# Patient Record
Sex: MALE | Race: WHITE | NOT HISPANIC OR LATINO | Employment: OTHER | URBAN - METROPOLITAN AREA
[De-identification: names, ages, dates, MRNs, and addresses within clinical notes are randomized per-mention and may not be internally consistent; named-entity substitution may affect disease eponyms.]

---

## 2020-10-09 ENCOUNTER — OFFICE VISIT (OUTPATIENT)
Dept: FAMILY MEDICINE | Facility: CLINIC | Age: 45
End: 2020-10-09
Payer: MEDICARE

## 2020-10-09 VITALS
TEMPERATURE: 97.5 F | BODY MASS INDEX: 25.42 KG/M2 | WEIGHT: 198 LBS | SYSTOLIC BLOOD PRESSURE: 128 MMHG | OXYGEN SATURATION: 96 % | HEART RATE: 99 BPM | DIASTOLIC BLOOD PRESSURE: 90 MMHG

## 2020-10-09 DIAGNOSIS — G25.9 EXTRAPYRAMIDAL MOVEMENT DISORDER: ICD-10-CM

## 2020-10-09 DIAGNOSIS — F20.9 SCHIZOPHRENIA, UNSPECIFIED TYPE (H): Primary | ICD-10-CM

## 2020-10-09 PROCEDURE — 99203 OFFICE O/P NEW LOW 30 MIN: CPT | Performed by: PHYSICIAN ASSISTANT

## 2020-10-09 RX ORDER — BENZTROPINE MESYLATE 0.5 MG/1
TABLET ORAL
COMMUNITY
Start: 2020-08-29 | End: 2022-01-04

## 2020-10-09 RX ORDER — GABAPENTIN 600 MG/1
TABLET ORAL
COMMUNITY
Start: 2020-08-10

## 2020-10-09 NOTE — PROGRESS NOTES
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  He does get the majority of his care through psychiatry, and they are a bit concerned about extrapyramidal movements.  At this time they are hesitant to adjust meds, and he feels he is very well controlled.      The most annoying movement is an overhead stretch that he does quite often.    Review of Systems   Constitutional, HEENT, cardiovascular, pulmonary, gi and gu systems are negative, except as otherwise noted.      Objective    There were no vitals taken for this visit.  There is no height or weight on file to calculate BMI.  Physical Exam   GENERAL: alert and no distress  EYES: Eyes grossly normal to inspection  RESP: lungs clear to auscultation - no rales, rhonchi or wheezes  CV: regular rate and rhythm, normal S1 S2, no S3 or S4, no murmur, click or rub, no peripheral edema and peripheral pulses strong  NEURO: does have the overhead stretch movement several times during his visit.      No results found for this or any previous visit (from the past 24 hour(s)).        Assessment & Plan     Schizophrenia, unspecified type (H)  Follows with psychiatry and well controlled from symptom stand point.    Extrapyramidal movement disorder  Will get him into neuro for further evaluation and treatment.  - NEUROLOGY ADULT REFERRAL  - NEUROLOGY ADULT REFERRAL     Tobacco Cessation:    reports that he has been smoking. He uses smokeless tobacco.               No follow-ups on file.    Lamin Soto PA-C  [facility]

## 2021-12-27 ENCOUNTER — NURSE TRIAGE (OUTPATIENT)
Dept: FAMILY MEDICINE | Facility: CLINIC | Age: 46
End: 2021-12-27
Payer: MEDICARE

## 2021-12-27 NOTE — TELEPHONE ENCOUNTER
"    Reason for Disposition    Weakness of a leg or foot (e.g., unable to bear weight, dragging foot)    Additional Information    Negative: Passed out (i.e., fainted, collapsed and was not responding)    Negative: Shock suspected (e.g., cold/pale/clammy skin, too weak to stand, low BP, rapid pulse)    Negative: Sounds like a life-threatening emergency to the triager    Negative: Major injury to the back (e.g., MVA, fall > 10 feet or 3 meters, penetrating injury, etc.)    Negative: Pain in the upper back over the ribs (rib cage) that radiates (travels) into the chest    Negative: Pain in the upper back over the ribs (rib cage) and worsened by coughing (or clearly increases with breathing)    Negative: SEVERE back pain of sudden onset and age > 60    Negative: SEVERE abdominal pain (e.g., excruciating)    Negative: Abdominal pain and age > 60    Negative: Unable to urinate (or only a few drops) and bladder feels very full    Negative: Loss of bladder or bowel control (urine or bowel incontinence; wetting self, leaking stool) of new onset    Negative: Numbness (loss of sensation) in groin or rectal area    Negative: Pain radiates into groin, scrotum    Negative: Blood in urine (red, pink, or tea-colored)    Negative: Vomiting and pain over lower ribs of back (i.e., flank - kidney area)    Answer Assessment - Initial Assessment Questions  1. ONSET: \"When did the pain begin?\"       5-6 days ago and getting worse  2. LOCATION: \"Where does it hurt?\" (upper, mid or lower back)      Low back and (R) leg numbness  3. SEVERITY: \"How bad is the pain?\"  (e.g., Scale 1-10; mild, moderate, or severe)    - MILD (1-3): doesn't interfere with normal activities     - MODERATE (4-7): interferes with normal activities or awakens from sleep     - SEVERE (8-10): excruciating pain, unable to do any normal activities       Using walker to walk, better laying down, can not put weight on let  4. PATTERN: \"Is the pain constant?\" (e.g., yes, " "no; constant, intermittent)       constant  5. RADIATION: \"Does the pain shoot into your legs or elsewhere?\"      (R) leg  6. CAUSE:  \"What do you think is causing the back pain?\"       scoliosis  7. BACK OVERUSE:  \"Any recent lifting of heavy objects, strenuous work or exercise?\"      no  8. MEDICATIONS: \"What have you taken so far for the pain?\" (e.g., nothing, acetaminophen, NSAIDS)      Ibuprofen  9. NEUROLOGIC SYMPTOMS: \"Do you have any weakness, numbness, or problems with bowel/bladder control?\"      (R) upper thigh weak and numb, pins and needles sensation front of thigh  10. OTHER SYMPTOMS: \"Do you have any other symptoms?\" (e.g., fever, abdominal pain, burning with urination, blood in urine)        no  11. PREGNANCY: \"Is there any chance you are pregnant?\" (e.g., yes, no; LMP)        n/a    Protocols used: BACK PAIN-A-OH      "

## 2021-12-30 NOTE — PROGRESS NOTES
Assessment & Plan     Acute right-sided low back pain with right-sided sciatica    - [facility] Referral; Future    Schizophrenia, unspecified type (H)  Stable, follows with psych               Tobacco Cessation:   reports that he has been smoking. He uses smokeless tobacco.          No follow-ups on file.    [facility]    Talib Gerard is a 46 year old who presents for the following health issues     HPI   Answers for HPI/ROS submitted by the patient on 1/4/2022  Your back pain is: new  What do you think is the original cause of your back pain?: not sure  When did you first notice your back pain? : 1-4 weeks ago  How would you describe your back pain? : cramping, shooting, stabbing  How often do you feel your back pain? : rarely  Where is your back pain located? : right lower back, right buttock, right hip  Where does your back pain spread? : right buttocks, right thigh  Since you noticed your back pain, how has it changed? : gradually improving  Does your back pain interfere with your job?: Not applicable  On a scale of 1-10 (10 being the worst), how strong is your back pain?: 10  What makes your back pain worse? : standing  Acupuncture:: not tried  Acetaminophen: not tried  Activity or Exercise: not tried  Chiropractor: not tried  Cold: helpful  Heat: helpful  Massage: not tried  Muscle relaxants : not tried  NSAIDS (Ibuprofen, Naproxen) : helpful  Opioids: not tried  Physical Therapy: not tried  Rest: helpful  Steroid Injection: not tried  Stretching : helpful  Surgery: not tried  TENS Unit: not tried  Topical pain relievers : not tried  Do you see any other healthcare providers for your back pain? : None      Pain History:  When did you first notice your pain? - 1 to 6 weeks   Have you seen any provider previously for this issue? Yes - [facility] urgent [facility]  How has your pain affected your ability to work? Not currently working - unrelated to pain  Where  in your body do you have pain? Musculoskeletal problem/pain  Onset/Duration: X10 days  Description  Location: Right leg, lower back - right  Joint Swelling: no  Redness: no  Pain: YES  Warmth: no  Intensity:  moderate  Progression of Symptoms:  improving  Accompanying signs and symptoms:   Fevers: no  Numbness/tingling/weakness: YES  History  Trauma to the area: no  Recent illness:  no  Previous similar problem: no  Previous evaluation:  YES  Precipitating or alleviating factors:  Aggravating factors include: standing, sitting to standing  Therapies tried and outcome: Took Rx steroid, this helped      Much improved from visit with [facility] after steroid pack.  They gave him rx for PHYSICAL THERAPY, but would like to go through  [facility].      At our last visit he was having some extrapyramidal movements secondary to pysch meds, those have been changed, and he is doing great.    Review of Systems   Constitutional, HEENT, cardiovascular, pulmonary, gi and gu systems are negative, except as otherwise noted.      Objective    BP (!) 134/92 (BP Location: Left arm, Patient Position: Chair, Cuff Size: Adult Regular)   Pulse 88   Temp 98.5  F (36.9  C) (Oral)   Wt 87.6 kg (193 lb 0.7 oz)   SpO2 98%   BMI 24.79 kg/m    Body mass index is 24.79 kg/m .  Physical Exam   GENERAL: alert and no distress  EYES: Eyes grossly normal to inspection  RESP: lungs clear to auscultation - no rales, rhonchi or wheezes  CV: regular rate and rhythm, normal S1 S2, no S3 or S4, no murmur, click or rub, no peripheral edema and peripheral pulses strong  PSYCH: mentation appears normal, affect normal/bright

## 2022-01-04 ENCOUNTER — OFFICE VISIT (OUTPATIENT)
Dept: FAMILY MEDICINE | Facility: CLINIC | Age: 47
End: 2022-01-04
Payer: MEDICARE

## 2022-01-04 VITALS
BODY MASS INDEX: 24.79 KG/M2 | HEART RATE: 88 BPM | DIASTOLIC BLOOD PRESSURE: 92 MMHG | WEIGHT: 193.04 LBS | TEMPERATURE: 98.5 F | SYSTOLIC BLOOD PRESSURE: 134 MMHG | OXYGEN SATURATION: 98 %

## 2022-01-04 DIAGNOSIS — M54.41 ACUTE RIGHT-SIDED LOW BACK PAIN WITH RIGHT-SIDED SCIATICA: Primary | ICD-10-CM

## 2022-01-04 DIAGNOSIS — F20.9 SCHIZOPHRENIA, UNSPECIFIED TYPE (H): ICD-10-CM

## 2022-01-04 PROCEDURE — 99213 OFFICE O/P EST LOW 20 MIN: CPT | Performed by: PHYSICIAN ASSISTANT

## 2022-01-04 RX ORDER — VALBENAZINE 80 MG/1
CAPSULE ORAL
COMMUNITY
Start: 2021-12-29 | End: 2024-05-10

## 2022-01-04 RX ORDER — PERPHENAZINE 8 MG/1
TABLET ORAL
COMMUNITY
Start: 2021-10-05

## 2022-01-04 ASSESSMENT — PAIN SCALES - GENERAL: PAINLEVEL: MILD PAIN (3)

## 2022-01-06 ENCOUNTER — THERAPY VISIT (OUTPATIENT)
Dept: PHYSICAL THERAPY | Facility: CLINIC | Age: 47
End: 2022-01-06
Payer: MEDICARE

## 2022-01-06 DIAGNOSIS — M54.41 ACUTE RIGHT-SIDED LOW BACK PAIN WITH RIGHT-SIDED SCIATICA: ICD-10-CM

## 2022-01-06 PROCEDURE — 97161 PT EVAL LOW COMPLEX 20 MIN: CPT | Mod: GP | Performed by: PHYSICAL THERAPIST

## 2022-01-06 PROCEDURE — 97110 THERAPEUTIC EXERCISES: CPT | Mod: GP | Performed by: PHYSICAL THERAPIST

## 2022-01-06 NOTE — PROGRESS NOTES
[facility] Initial Evaluation     Present: no    Subjective:  Moustapha Schreiber is a 46 year old male with a lumbar condition. Pt reports that 2 weeks ago he started getting pain in his right leg that gradually started getting worse and is going into the lumbar region now. Chief complaint is pain first thing in the morning and with sitting. Numbness in the anterior right thigh/hip otherwise no vague symptoms. Would like to get back to PLOF, walk, sit, and do everything pain free.      Symptoms commenced as a result of: unsure. Condition occurred in the following environment: unsure. Onset of symptoms: 1/4/22(date of MD PT order). Location of symptoms: lower lumbar right. Pain level on number scale: 4/10. Quality of pain: aching. Associated symptoms: numbness in the anterior hip and thigh on the right. Pain frequency (constant/intermittent): intermittent. Symptoms are exacerbated by: sitting, mornings. Symptoms are relieved by: walking, steroids, movement. Progression of symptoms since onset (same/better/worse): better. Special tests (x-ray, MRI, CT scan, EMG, bone scan): see EPIC. Previous treatment: medication. Improvement with previous treatment: yes. General health as reported by patient is good. Pertinent medical history includes: See Epic. Medical allergies: see Epic. Other pertinent surgeries: see Epic. Current medications: See Epic. Occupation: none. Patient is (working in normal job without restrictions/working in normal job with restrictions/working in an alternate job/not working due to present treatment problem): none. Primary job tasks: lifting, carrying, driving, sitting. Barriers at home/work: None reported by patient. Red flags: None reported by patient.    Objective  Posture: forwad head rounded shoulders    Gait: normal    Screening: negative    Flexibility: unremarkable    Lumbar Movement Loss Response   Flexion Slightly limited no pain and no change with repeated  motion   Extension Slightly limited no pain and no change with repeated    Side bending/glide L Full   Side bending/glide R Full    Rotation L Full   Rotation R Full   Quadrants (if applicable)      Hip PROM/Strength: ROM WFL zain    Neurological:    Myotomes L R   L1-2 (hip flexion) 5/5 5/5   L3 (knee extension) 5/5 5/5   L4 (ankle DF) 5/5 5/5   L5 (g. toe ext) 5/5 5/5   S1 (ankle PF or knee flex) 5/5 5/5     Dural Signs L R   Slump - -   SLR - -     Palpation: unremarkable    Prone Assessment: LATOYA central lumbar pain no thigh symptoms tolerates, Pressups slightly limited and with repeated motion tolerates with slight thigh symptoms    Accessory Motion: CPA T10, L3-L5 tender and no change with repeated     Functional Squat and Balance: Fair squat, fair SLS zain   Key Findings  -decreased lumbar extension ROM will trial extension  Assessment/Plan:    Patient is a 46 year old male with lumbar complaints.    Patient has the following significant findings with corresponding treatment plan.                Diagnosis 1:  Acute lumbar pain with right sided radiculopathy  Pain -  manual therapy, self management, education and home program  Decreased ROM/flexibility - manual therapy and therapeutic exercise  Decreased joint mobility - manual therapy and therapeutic exercise  Decreased strength - therapeutic exercise and therapeutic activities  Impaired muscle performance - neuro re-education  Decreased function - therapeutic activities  Impaired posture - neuro re-education    Therapy Evaluation Codes:   1) History comprised of:   Personal factors that impact the plan of care:      Past/current experiences and Time since onset of symptoms.    Comorbidity factors that impact the plan of care are:      Depression, Mental illness and Numbness/tingling.     Medications impacting care: Anti-depressant and Steroids.  2) Examination of Body Systems comprised of:   Body structures and functions that impact the plan of care:      Hip  and Lumbar spine.   Activity limitations that impact the plan of care are:      Bathing, Bending, Cooking, Driving, Dressing, Lifting, Reading/Computer work, Sitting, Squatting/kneeling, Stairs, Standing and Walking.  3) Clinical presentation characteristics are:   Stable/Uncomplicated.  4) Decision-Making    Low complexity using standardized patient assessment instrument and/or measureable assessment of functional outcome.  Cumulative Therapy Evaluation is: Low complexity.    Previous and current functional limitations:  (See Goal Flow Sheet for this information)    Short term and Long term goals: (See Goal Flow Sheet for this information)     Communication ability:  Patient appears to be able to clearly communicate and understand verbal and written communication and follow directions correctly.  Treatment Explanation - The following has been discussed with the patient:   RX ordered/plan of care  Anticipated outcomes  Possible risks and side effects  This patient would benefit from PT intervention to resume normal activities.   Rehab potential is good.    Frequency:  1 X week, once daily  Duration:  for 10 weeks  Discharge Plan:  Achieve all LTG.  Independent in home treatment program.  Reach maximal therapeutic benefit.    Please refer to the daily flowsheet for treatment today, total treatment time and time spent performing 1:1 timed codes.     Please Contact me with any questions or concerns. Thank you for for patience and cooperation.     Elan Arndt PT, DPT, CSCS  Physical Therapist  [facility] [place]  [phone]

## 2022-01-06 NOTE — PROGRESS NOTES
[facility]    OUTPATIENT Physical Therapy ORTHOPEDIC EVALUATION  PLAN OF TREATMENT FOR OUTPATIENT REHABILITATION  (COMPLETE FOR INITIAL CLAIMS ONLY)  Patient's Last Name, First Name, M.I.  YOB: 1975  Moustapha Schreiber    Provider s Name:  [facility]   Medical Record No.  [id number]   Start of Care Date:  01/06/22   Onset Date:   01/04/22 (date of MD PT order)   Type:     _X__PT   ___OT Medical Diagnosis:    Encounter Diagnosis   Name Primary?     Acute right-sided low back pain with right-sided sciatica         Treatment Diagnosis:  Acute lumbar pain with right sided radiculopathy        Goals:     01/06/22 0500   Body Part   Goals listed below are for lumbar   Goal #1   Goal #1 posture/body mechanics   Previous Functional Level Patient reports fair posture and proper body mechanics   Current Functional Level Fair posture/body mechanics   STG Target Performance Patient able to demonstrate good posture and body mechanics when prompted   Rationale to prevent neck/back pain and avoid injury when lifting/carrying and performing tasks requiring bending   Due Date 01/27/22   LTG Target Performance Patient able to demonstrate good posture and body mechanics without prompting   Rationale to prevent neck/back pain and avoid injury when lifting/carrying and performing tasks requiring bending   Due Date 03/17/22   Goal #2   Goal #2 sitting   Previous Functional Level No restrictions   Current Functional Level Minutes patient can sit   Performance level x 15 minutes limited by pain    STG Target Performance Minutes patient will be able to sit   Performance level > 30 minutes with less than 2/10 pain   Rationale for personal hygiene;to allow rest from standing;for community transportation;for job requirements in their work place   Due date 01/27/22   LTG Target Performance Hours  patient will be able to sit   Performance Level unrestricted pain free   Rationale to allow rest from standing;for personal hygiene;for community transportation;for job requirements in their work place   Due date 03/17/22       Therapy Frequency:  1 x week   Predicted Duration of Therapy Intervention:  10 weeks    Elan Arndt, PT                 I CERTIFY THE NEED FOR THESE SERVICES FURNISHED UNDER        THIS PLAN OF TREATMENT AND WHILE UNDER MY CARE     (Physician attestation of this document indicates review and certification of the therapy plan).                       Certification Date From:  01/06/22   Certification Date To:  03/17/22    Referring Provider:  No ref. provider found    Initial Assessment        See Epic Evaluation SOC Date: 01/06/22

## 2022-01-20 ENCOUNTER — THERAPY VISIT (OUTPATIENT)
Dept: PHYSICAL THERAPY | Facility: CLINIC | Age: 47
End: 2022-01-20
Payer: MEDICARE

## 2022-01-20 DIAGNOSIS — M54.41 ACUTE RIGHT-SIDED LOW BACK PAIN WITH RIGHT-SIDED SCIATICA: ICD-10-CM

## 2022-01-20 PROCEDURE — 97110 THERAPEUTIC EXERCISES: CPT | Mod: GP | Performed by: PHYSICAL THERAPIST

## 2022-02-03 ENCOUNTER — THERAPY VISIT (OUTPATIENT)
Dept: PHYSICAL THERAPY | Facility: CLINIC | Age: 47
End: 2022-02-03
Payer: MEDICARE

## 2022-02-03 DIAGNOSIS — M54.41 ACUTE RIGHT-SIDED LOW BACK PAIN WITH RIGHT-SIDED SCIATICA: ICD-10-CM

## 2022-02-03 PROCEDURE — 97110 THERAPEUTIC EXERCISES: CPT | Mod: GP | Performed by: PHYSICAL THERAPIST

## 2022-02-03 NOTE — PROGRESS NOTES
DISCHARGE REPORT    Progress reporting period is from 1/6/22 to 2/3/22.       SUBJECTIVE  Subjective changes noted by patient:  Subjective: Moustapha reports that he still has some numbness, but the pain is no longer there. Feels ready to discharge and will continue HEP as able.     Current pain level is 0/10 Current Pain level: 0/10.     Previous pain level was  4/10 Initial Pain level: 4/10.   Changes in function:  Yes (See Goal flowsheet attached for changes in current functional level)  Adverse reaction to treatment or activity: None    OBJECTIVE  Changes noted in objective findings:  Yes, improved motion, posture, radicular symptoms, strength, and function  Objective: Lumbar AROM: flexion full just tightness and with repeated motion improves, extension full no pain and no change with repeated motion, SB and rotation full zain pain free, pressups full no pain and tolerates well with repeated motion     ASSESSMENT/PLAN  Updated problem list and treatment plan: Diagnosis 1:  Acute lumbar pain with right sided radiculopathy  Decreased strength - therapeutic exercise and therapeutic activities  Impaired muscle performance - neuro re-education  Decreased function - therapeutic activities  STG/LTGs have been met or progress has been made towards goals:  Yes (See Goal flow sheet completed today.)  Assessment of Progress: The patient has met all of their long term goals.  Self Management Plans:  Patient has been instructed in a home treatment program.  Patient is independent in a home treatment program.  Patient  has been instructed in self management of symptoms.  Patient is independent in self management of symptoms.  I have re-evaluated this patient and find that the nature, scope, duration and intensity of the therapy is appropriate for the medical condition of the patient.  Moustapha continues to require the following intervention to meet STG and LTG's:  PT intervention is no longer required to meet  STG/LTG.    Recommendations:  This patient is ready to be discharged from therapy and continue their home treatment program.    Please refer to the daily flowsheet for treatment today, total treatment time and time spent performing 1:1 timed codes.    Please Contact me with any questions or concerns. Thank you for for patience and cooperation.     Elan Arndt PT, DPT, [facility]  Physical Therapist  [facility] [facility] [place]  [phone]

## 2022-04-25 NOTE — PROGRESS NOTES
Assessment & Plan     Rash and nonspecific skin eruption  The exact diagnosis of the skin lesion is not clear.  It has some consistency with atopic dermatitis and psoriasis.  It is possible that this could be fungal as well.  I recommended he see dermatology for further evaluation.  In the meantime he may try triamcinolone to see if this helps.  If the triamcinolone is not helping after couple of weeks he could switch to an a topical antifungal like clotrimazole or terbinafine.  - triamcinolone (KENALOG) 0.1 % external ointment; Apply topically 2 times daily for 14 days  - Adult Dermatology Referral; Future    Elevated blood pressure reading without diagnosis of hypertension  His initial blood pressure was high.  This came down on the second check, but was still borderline elevated.  I recommended that he stop smoking, start getting more regular exercise and decrease his salt intake which he reports is very high.  I recommended he schedule follow-up with his PCP in 3 months or sooner if needed.    Tobacco abuse  We discussed smoking cessation and I encouraged him to quit.  He is feeling motivated to quit on his own without medication.               Tobacco Cessation:   reports that he has been smoking. He uses smokeless tobacco.  Tobacco Cessation Action Plan: We discussed smoking cessation and I encouraged him to quit.        Return in about 4 weeks (around 5/25/2022) for Follow up if symptoms not improving..    Beau Hutchins, [facility]   Moustapha is a 47 year old who presents for the following health issues     History of Present Illness       Reason for visit:  Thing on my leg  Symptom onset:  More than a month  Symptom intensity:  Mild  Symptom progression:  Staying the same  Had these symptoms before:  No  What makes it worse:  No  What makes it better:  No    He eats 0-1 servings of fruits and vegetables daily.He consumes 1 sweetened beverage(s) daily.He  exercises with enough effort to increase his heart rate 20 to 29 minutes per day.  He exercises with enough effort to increase his heart rate 3 or less days per week.   He is taking medications regularly.       Rash  Onset/Duration: 1-2 months   Description  Location: Right thigh   Character: round, red  Itching: no  Intensity:  mild  Progression of Symptoms:  same  Accompanying signs and symptoms:   Fever: no  Body aches or joint pain: no  Sore throat symptoms: no  Recent cold symptoms: no  History:           Previous episodes of similar rash: None  New exposures:  None  Recent travel: no  Exposure to similar rash: no  Precipitating or alleviating factors: None  Therapies tried and outcome: none     he denies any recent tick exposures or bites.    Review of Systems         Objective    /88   Pulse 94   Temp 98.7  F (37.1  C) (Temporal)   Wt 86.5 kg (190 lb 9.6 oz)   SpO2 97%   BMI 24.47 kg/m    Body mass index is 24.47 kg/m .  Physical Exam   GENERAL: healthy, alert and no distress  EYES: Eyes grossly normal to inspection, PERRL and conjunctivae and sclerae normal  NECK: no adenopathy, no asymmetry, masses, or scars and thyroid normal to palpation  CV: regular rate and rhythm, normal S1 S2, no S3 or S4, no murmur, click or rub, no peripheral edema and peripheral pulses strong  MS: no gross musculoskeletal defects noted, no edema  SKIN: There is a 2 x 2 centimeter circular, well demarcated skin lesion on the right anterior thigh.  The border has slightly darker erythematous color than the central portion of the lesion.  There are no other similar lesions on his body.  NEURO: Normal strength and tone, mentation intact and speech normal  PSYCH: mentation appears normal, affect normal/bright

## 2022-04-27 ENCOUNTER — OFFICE VISIT (OUTPATIENT)
Dept: FAMILY MEDICINE | Facility: CLINIC | Age: 47
End: 2022-04-27
Payer: MEDICARE

## 2022-04-27 VITALS
OXYGEN SATURATION: 97 % | BODY MASS INDEX: 24.47 KG/M2 | WEIGHT: 190.6 LBS | HEART RATE: 94 BPM | TEMPERATURE: 98.7 F | DIASTOLIC BLOOD PRESSURE: 88 MMHG | SYSTOLIC BLOOD PRESSURE: 136 MMHG

## 2022-04-27 DIAGNOSIS — R03.0 ELEVATED BLOOD PRESSURE READING WITHOUT DIAGNOSIS OF HYPERTENSION: ICD-10-CM

## 2022-04-27 DIAGNOSIS — R21 RASH AND NONSPECIFIC SKIN ERUPTION: Primary | ICD-10-CM

## 2022-04-27 DIAGNOSIS — Z72.0 TOBACCO ABUSE: ICD-10-CM

## 2022-04-27 PROCEDURE — 99214 OFFICE O/P EST MOD 30 MIN: CPT | Performed by: FAMILY MEDICINE

## 2022-04-27 RX ORDER — TRIAMCINOLONE ACETONIDE 1 MG/G
OINTMENT TOPICAL 2 TIMES DAILY
Qty: 30 G | Refills: 0 | Status: SHIPPED | OUTPATIENT
Start: 2022-04-27 | End: 2022-05-11

## 2023-06-30 ENCOUNTER — OFFICE VISIT (OUTPATIENT)
Dept: FAMILY MEDICINE | Facility: CLINIC | Age: 48
End: 2023-06-30
Payer: MEDICARE

## 2023-06-30 VITALS
TEMPERATURE: 97.2 F | RESPIRATION RATE: 16 BRPM | OXYGEN SATURATION: 98 % | DIASTOLIC BLOOD PRESSURE: 83 MMHG | HEART RATE: 84 BPM | BODY MASS INDEX: 24.52 KG/M2 | WEIGHT: 185 LBS | SYSTOLIC BLOOD PRESSURE: 127 MMHG | HEIGHT: 73 IN

## 2023-06-30 DIAGNOSIS — L30.9 ECZEMA, UNSPECIFIED TYPE: Primary | ICD-10-CM

## 2023-06-30 PROCEDURE — 99213 OFFICE O/P EST LOW 20 MIN: CPT | Performed by: PHYSICIAN ASSISTANT

## 2023-06-30 ASSESSMENT — PAIN SCALES - GENERAL: PAINLEVEL: NO PAIN (0)

## 2023-06-30 NOTE — PROGRESS NOTES
"  Assessment & Plan     Eczema, unspecified type  Looks pretty much resolved at this point.  Discussed lotions, otc cortisone as prevention.  Offered referral to derm, declines for now               Nicotine/Tobacco Cessation:  He reports that he has been smoking cigarettes. He uses smokeless tobacco.  Nicotine/Tobacco Cessation Plan:             [facility]    Talib Gerard is a 48 year old, presenting for the following health issues:  Sore (Left ankle)        6/30/2023     7:00 AM   Additional Questions   Roomed by Bruna MANN     History of Present Illness       Reason for visit:  A sore left ankle and on top of right foot   Symptom onset:  More than a month  Symptoms include:  Discolored, itchy, has fading, no drainage  Symptom intensity:  Mild  Symptom progression:  Improving  Had these symptoms before:  Yes    He eats 0-1 servings of fruits and vegetables daily.He consumes 0 sweetened beverage(s) daily.He exercises with enough effort to increase his heart rate 9 or less minutes per day.  He exercises with enough effort to increase his heart rate 3 or less days per week.   He is taking medications regularly.               Review of Systems   Constitutional, HEENT, cardiovascular, pulmonary, gi and gu systems are negative, except as otherwise noted.      Objective    /83 (BP Location: Left arm, Patient Position: Sitting, Cuff Size: Adult Regular)   Pulse 84   Temp 97.2  F (36.2  C) (Temporal)   Resp 16   Ht 1.854 m (6' 1\")   Wt 83.9 kg (185 lb)   SpO2 98%   BMI 24.41 kg/m    Body mass index is 24.41 kg/m .  Physical Exam   GENERAL: alert and no distress  EYES: Eyes grossly normal to inspection  SKIN: 2 small post inflammatory pigment areas consistent with pruritis.                    "

## 2024-05-10 ENCOUNTER — OFFICE VISIT (OUTPATIENT)
Dept: FAMILY MEDICINE | Facility: CLINIC | Age: 49
End: 2024-05-10
Payer: MEDICARE

## 2024-05-10 VITALS
HEIGHT: 73 IN | BODY MASS INDEX: 24.75 KG/M2 | DIASTOLIC BLOOD PRESSURE: 94 MMHG | WEIGHT: 186.7 LBS | RESPIRATION RATE: 18 BRPM | SYSTOLIC BLOOD PRESSURE: 145 MMHG | OXYGEN SATURATION: 96 % | HEART RATE: 101 BPM | TEMPERATURE: 97.6 F

## 2024-05-10 DIAGNOSIS — L20.84 INTRINSIC ECZEMA: Primary | ICD-10-CM

## 2024-05-10 DIAGNOSIS — F20.9 SCHIZOPHRENIA, UNSPECIFIED TYPE (H): ICD-10-CM

## 2024-05-10 PROCEDURE — 99213 OFFICE O/P EST LOW 20 MIN: CPT | Performed by: PHYSICIAN ASSISTANT

## 2024-05-10 RX ORDER — ESCITALOPRAM OXALATE 20 MG/1
20 TABLET ORAL DAILY
COMMUNITY
Start: 2024-05-10

## 2024-05-10 RX ORDER — BENZTROPINE MESYLATE 0.5 MG/1
1 TABLET ORAL
COMMUNITY
Start: 2024-03-19

## 2024-05-10 RX ORDER — TRIAMCINOLONE ACETONIDE 1 MG/G
OINTMENT TOPICAL 2 TIMES DAILY
Qty: 30 G | Refills: 1 | Status: SHIPPED | OUTPATIENT
Start: 2024-05-10

## 2024-05-10 ASSESSMENT — PAIN SCALES - GENERAL: PAINLEVEL: NO PAIN (0)

## 2024-05-10 NOTE — PROGRESS NOTES
"  Assessment & Plan     Intrinsic eczema    - Adult Dermatology  Referral; Future  - triamcinolone (KENALOG) 0.1 % external ointment; Apply topically 2 times daily    Schizophrenia  Stable, meds updated.  [facility]              Talib   Moustapha is a 49 year old, presenting for the following health issues:  Derm Problem (R foot)        5/10/2024    12:58 PM   Additional Questions   Roomed by Noemí SLAUGHTER MA apprentice   Accompanied by n/a     History of Present Illness       Reason for visit:  Eczema    He eats 0-1 servings of fruits and vegetables daily.He consumes 0 sweetened beverage(s) daily.He exercises with enough effort to increase his heart rate 9 or less minutes per day.  He exercises with enough effort to increase his heart rate 3 or less days per week.   He is taking medications regularly.           Rash (reoccuring eczema)  Onset/Duration: 3 months ago started getting worse   Description  Location: R foot, R ankle, R calf, L thigh  Character: round, blotchy, red, itchy  Itching: moderate, can be severe if not had steroid cream  Intensity:  moderate  Progression of Symptoms:  improving  Accompanying signs and symptoms:   Fever: No  Body aches or joint pain: No  Sore throat symptoms: No  Recent cold symptoms: No  History:           Previous episodes of similar rash: Previous history of eczema  New exposures:  None   Recent travel: No  Exposure to similar rash: YES  Precipitating or alleviating factors: just the steroid cream from his moms.   Therapies tried and outcome: topical steroid - leftover from mom, has been working        Review of Systems  Constitutional, HEENT, cardiovascular, pulmonary, gi and gu systems are negative, except as otherwise noted.      Objective    BP (!) 145/94 (BP Location: Left arm, Patient Position: Sitting, Cuff Size: Adult Regular)   Pulse 101   Temp 97.6  F (36.4  C) (Temporal)   Resp 18   Ht 1.85 m (6' 0.84\")   Wt 84.7 kg (186 lb 11.2 oz)   SpO2 96%  "  BMI 24.74 kg/m    Body mass index is 24.74 kg/m .  Physical Exam   GENERAL: alert and no distress  EYES: Eyes grossly normal to inspection  RESP: lungs clear to auscultation - no rales, rhonchi or wheezes  CV: regular rate and rhythm, normal S1 S2, no S3 or S4, no murmur, click or rub, no peripheral edema   SKIN: erythematous patch over foot and a few on lower extremities            Signed Electronically by: Lamin Soto PA-C

## 2024-06-02 ENCOUNTER — HEALTH MAINTENANCE LETTER (OUTPATIENT)
Age: 49
End: 2024-06-02

## 2025-01-09 ENCOUNTER — VIRTUAL VISIT (OUTPATIENT)
Dept: FAMILY MEDICINE | Facility: CLINIC | Age: 50
End: 2025-01-09
Payer: MEDICARE

## 2025-01-09 DIAGNOSIS — H93.13 TINNITUS OF BOTH EARS: ICD-10-CM

## 2025-01-09 DIAGNOSIS — H93.8X3 SENSATION OF PLUGGED EAR ON BOTH SIDES: Primary | ICD-10-CM

## 2025-01-09 NOTE — PROGRESS NOTES
Moustapha is a 49 year old who is being evaluated via a billable video visit.    How would you like to obtain your AVS? MyChart  If the video visit is dropped, the invitation should be resent by: Text to cell phone: [phone]  Will anyone else be joining your video visit? No      Assessment & Plan     Sensation of plugged ear on both sides  Difficult to determine cause with video visit, need to get him in clinic to evaluate.  Will have our team see if we can get in or discuss other options for in person    Tinnitus of both ears  As above          Nicotine/Tobacco Cessation  He reports that he has been smoking cigarettes. He started smoking about 12 years ago. He has a 6.3 pack-year smoking history. He has quit using smokeless tobacco.  Nicotine/Tobacco Cessation Plan              Subjective   Moustapha is a 49 year old, presenting for the following health issues:  No chief complaint on file.      Video Start Time: 1157    History of Present Illness       Reason for visit:  Ringing in ears. Hearing loss  Symptom onset:  3-7 days ago  Symptom intensity:  Moderate  Symptom progression:  Staying the same  Had these symptoms before:  No   He is taking medications regularly.               Review of Systems  Constitutional, HEENT, cardiovascular, pulmonary, gi and gu systems are negative, except as otherwise noted.      Objective           Vitals:  No vitals were obtained today due to virtual visit.    Physical Exam   GENERAL: alert and no distress  EYES: Eyes grossly normal to inspection.  No discharge or erythema, or obvious scleral/conjunctival abnormalities.  RESP: No audible wheeze, cough, or visible cyanosis.    SKIN: Visible skin clear. No significant rash, abnormal pigmentation or lesions.  NEURO: Cranial nerves grossly intact.  Mentation and speech appropriate for age.  PSYCH: Appropriate affect, tone, and pace of words          Video-Visit Details    Type of service:  Video Visit   Video End Time:1210  Originating  Location (pt. Location): Home    Distant Location (provider location):  On-site  Platform used for Video Visit: Cherie  Signed Electronically by: Lamin Soto PA-C

## 2025-01-09 NOTE — PROGRESS NOTES
Called and LVM To call back and schedule a Office Visit to discuss Hearing Concern Per TIERA  [facility] Unit Coordinator

## 2025-01-13 ENCOUNTER — OFFICE VISIT (OUTPATIENT)
Dept: DERMATOLOGY | Facility: CLINIC | Age: 50
End: 2025-01-13
Payer: MEDICARE

## 2025-01-13 DIAGNOSIS — L81.9 POST-INFLAMMATORY PIGMENTARY CHANGES: ICD-10-CM

## 2025-01-13 DIAGNOSIS — L82.1 SEBORRHEIC KERATOSES: ICD-10-CM

## 2025-01-13 DIAGNOSIS — R21 RASH AND NONSPECIFIC SKIN ERUPTION: Primary | ICD-10-CM

## 2025-01-13 DIAGNOSIS — L20.84 INTRINSIC ECZEMA: ICD-10-CM

## 2025-01-13 DIAGNOSIS — D22.9 MULTIPLE BENIGN NEVI: ICD-10-CM

## 2025-01-13 PROCEDURE — 99204 OFFICE O/P NEW MOD 45 MIN: CPT | Performed by: STUDENT IN AN ORGANIZED HEALTH CARE EDUCATION/TRAINING PROGRAM

## 2025-01-13 ASSESSMENT — PAIN SCALES - GENERAL: PAINLEVEL_OUTOF10: NO PAIN (0)

## 2025-01-13 NOTE — PATIENT INSTRUCTIONS
Proper skin care from [facility]:    -Eliminate harsh soaps as they strip the natural oils from the skin, often resulting in dry itchy skin ( i.e. Dial, Zest, Sudanese Spring)  -Use mild soaps such as Cetaphil or Dove Sensitive Skin in the shower. You do not need to use soap on arms, legs, and trunk every time you shower unless visibly soiled.   -Avoid hot or cold showers.  -After showering, lightly dry off and apply moisturizing within 2-3 minutes. This will help trap moisture in the skin.   -Aggressive use of a moisturizer at least 1-2 times a day to the entire body (including -Vanicream, Cetaphil, Aquaphor or Cerave) and moisturize hands after every washing.  -We recommend using moisturizers that come in a tub that needs to be scooped out, not a pump. This has more of an oil base. It will hold moisture in your skin much better than a water base moisturizer. The above recommended are non-pore clogging.      Wear a sunscreen with at least SPF 30 on your face, ears, neck and V of the chest daily. Wear sunscreen on other areas of the body if those areas are exposed to the sun throughout the day. Sunscreens can contain physical and/or chemical blockers. Physical blockers are less likely to clog pores, these include zinc oxide and titanium dioxide. Reapply every two hour and after swimming.     Sunscreen examples: https://www.ewg.org/sunscreen/    UV radiation  UVA radiation remains constant throughout the day and throughout the year. It is a longer wavelength than UVB and therefore penetrates deeper into the skin leading to immediate and delayed tanning, photoaging, and skin cancer. 70-80% of UVA and UVB radiation occurs between the hours of 10am-2pm.  UVB radiation  UVB radiation causes the most harmful effects and is more significant during the summer months. However, snow and ice can reflect UVB radiation leading to skin damage during the winter months as well. UVB radiation is responsible for tanning,  burning, inflammation, delayed erythema (pinkness), pigmentation (brown spots), and skin cancer.     I recommend self monthly full body exams and yearly full body exams with a dermatology provider. If you develop a new or changing lesion please follow up for examination. Most skin cancers are pink and scaly or pink and pearly. However, we do see blue/brown/black skin cancers.  Consider the ABCDEs of melanoma when giving yourself your monthly full body exam ( don't forget the groin, buttocks, feet, toes, etc). A-asymmetry, B-borders, C-color, D-diameter, E-elevation or evolving. If you see any of these changes please follow up in clinic. If you cannot see your back I recommend purchasing a hand held mirror to use with a larger wall mirror.       Checking for Skin Cancer  You can find cancer early by checking your skin each month. There are 3 kinds of skin cancer. They are melanoma, basal cell carcinoma, and squamous cell carcinoma. Doing monthly skin checks is the best way to find new marks or skin changes. Follow the instructions below for checking your skin.   The ABCDEs of checking moles for melanoma   Check your moles or growths for signs of melanoma using ABCDE:   Asymmetry: the sides of the mole or growth don t match  Border: the edges are ragged, notched, or blurred  Color: the color within the mole or growth varies  Diameter: the mole or growth is larger than 6 mm (size of a pencil eraser)  Evolving: the size, shape, or color of the mole or growth is changing (evolving is not shown in the images below)    Checking for other types of skin cancer  Basal cell carcinoma or squamous cell carcinoma have symptoms such as:     A spot or mole that looks different from all other marks on your skin  Changes in how an area feels, such as itching, tenderness, or pain  Changes in the skin's surface, such as oozing, bleeding, or scaliness  A sore that does not heal  New swelling or redness beyond the border of a  mole    Who s at risk?  Anyone can get skin cancer. But you are at greater risk if you have:   Fair skin, light-colored hair, or light-colored eyes  Many moles or abnormal moles on your skin  A history of sunburns from sunlight or tanning beds  A family history of skin cancer  A history of exposure to radiation or chemicals  A weakened immune system  If you have had skin cancer in the past, you are at risk for recurring skin cancer.   How to check your skin  Do your monthly skin checkups in front of a full-length mirror. Check all parts of your body, including your:   Head (ears, face, neck, and scalp)  Torso (front, back, and sides)  Arms (tops, undersides, upper, and lower armpits)  Hands (palms, backs, and fingers, including under the nails)  Buttocks and genitals  Legs (front, back, and sides)  Feet (tops, soles, toes, including under the nails, and between toes)  If you have a lot of moles, take digital photos of them each month. Make sure to take photos both up close and from a distance. These can help you see if any moles change over time.   Most skin changes are not cancer. But if you see any changes in your skin, call your doctor right away. Only he or she can diagnose a problem. If you have skin cancer, seeing your doctor can be the first step toward getting the treatment that could save your life.   [employer] last reviewed this educational content on 4/1/2019 2000-2020 [employer]. [address] [place] [address], [place] [address]. All rights reserved. This information is not intended as a substitute for professional medical care. Always follow your healthcare professional's instructions.       When should I call my doctor?  If you are worsening or not improving, please, contact us or seek urgent care as noted below.     Who should I call with questions (adults)?    [facility] [phone]  For urgent needs outside of business hours call the [facility] at  [phone] and ask for the dermatology resident on call to be paged  If this is a medical emergency and you are unable to reach an ER, Call [phone]      If you need a prescription refill, please contact your pharmacy. Refills are approved or denied by our Physicians during normal business hours, Monday through Fridays  Per office policy, refills will not be granted if you have not been seen within the past year (or sooner depending on your child's condition)

## 2025-01-13 NOTE — LETTER
1/13/2025      Moustapha Schreiber  [address] [facility] [place] [address]      Dear Colleague,    Thank you for referring your patient, Moustapha Schreiber, to the [facility]. Please see a copy of my visit note below.    [facility] Dermatology Note    Encounter Date: Jan 13, 2025    Dermatology Problem List:    ______________________________________    Impression/Plan:  Moustapha was seen today for skin check.    Diagnoses and all orders for this visit:    Post-inflammatory pigmentary changes  Rash and nonspecific skin eruption  Intrinsic eczema  -     Adult Dermatology  Referral  - continue TMC BID PRN    Multiple benign nevi  - benign    Seborrheic keratoses  - benign      Follow-up PRN.       Staff Involved:  Staff Only    Destin Park MD   of Dermatology  Department of Dermatology  [facility]      CC:   Chief Complaint   Patient presents with     Skin Check     FBSC  Area to top of left foot, dark spots on face and lesion to back left side       History of Present Illness:  Mr. Moustapha Schreiber is a 49 year old male who presents as a new patient.    Pt seen in  12/20 for rash on forearms. Tried steroid creawm he was previously rx but it did not help. He was given TMC and keflex.     Has concerns about area on top of L foot, dark spots on face and lesion back L side     Labs:      Physical exam:  Vitals: There were no vitals taken for this visit.  GEN: well developed, well-nourished, in no acute distress, in a pleasant mood.     SKIN: Whalen phototype 1  - Sun-exposed skin, which includes the head/face, neck, both arms, digits, and/or nails was examined.   - eczematous papules and patches on forearms  - pigment alteration at sites of previous inflammation  - Stuck on brown papules on trunk and extremities   - scattered brown papules on trunk and extremities   - No other lesions of concern on areas  examined.     Past Medical History:   Past Medical History:   Diagnosis Date     Schizophrenia (H) 01/01/2007     No past surgical history on file.    Social History:   reports that he has been smoking cigarettes. He started smoking about 12 years ago. He has a 6.3 pack-year smoking history. He has been exposed to tobacco smoke. He has quit using smokeless tobacco. He reports current alcohol use. He reports that he does not use drugs.    Family History:  History reviewed. No pertinent family history.    Medications:  Current Outpatient Medications   Medication Sig Dispense Refill     benztropine (COGENTIN) 0.5 MG tablet Take 1 tablet by mouth 2 times daily       escitalopram (LEXAPRO) 20 MG tablet Take 1 tablet (20 mg) by mouth daily       gabapentin (NEURONTIN) 600 MG tablet TAKE 2 TABLETS BY MOUTH TWICE A DAY       perphenazine 8 MG tablet        triamcinolone (KENALOG) 0.1 % external ointment Apply topically 2 times daily 30 g 1     Allergies   Allergen Reactions     Penicillins Hives               Again, thank you for allowing me to participate in the care of your patient.        Sincerely,        Destin Park MD    Electronically signed

## 2025-01-13 NOTE — PROGRESS NOTES
[facility] Dermatology Note    Encounter Date: Jan 13, 2025    Dermatology Problem List:    ______________________________________    Impression/Plan:  Moustapha was seen today for skin check.    Diagnoses and all orders for this visit:    Post-inflammatory pigmentary changes  Rash and nonspecific skin eruption  Intrinsic eczema  -     Adult Dermatology  Referral  - continue TMC BID PRN    Multiple benign nevi  - benign    Seborrheic keratoses  - benign      Follow-up PRN.       Staff Involved:  Staff Only    Destin Park MD   of Dermatology  Department of Dermatology  [facility] [employer] [facility]      CC:   Chief Complaint   Patient presents with    Skin Check     FBSC  Area to top of left foot, dark spots on face and lesion to back left side       History of Present Illness:  Mr. Moustapha Schreiber is a 49 year old male who presents as a new patient.    Pt seen in  12/20 for rash on forearms. Tried steroid creawm he was previously rx but it did not help. He was given TMC and keflex.     Has concerns about area on top of L foot, dark spots on face and lesion back L side     Labs:      Physical exam:  Vitals: There were no vitals taken for this visit.  GEN: well developed, well-nourished, in no acute distress, in a pleasant mood.     SKIN: Whalen phototype 1  - Sun-exposed skin, which includes the head/face, neck, both arms, digits, and/or nails was examined.   - eczematous papules and patches on forearms  - pigment alteration at sites of previous inflammation  - Stuck on brown papules on trunk and extremities   - scattered brown papules on trunk and extremities   - No other lesions of concern on areas examined.     Past Medical History:   Past Medical History:   Diagnosis Date    Schizophrenia (H) 01/01/2007     No past surgical history on file.    Social History:   reports that he has been smoking cigarettes. He started smoking about 12 years ago. He  has a 6.3 pack-year smoking history. He has been exposed to tobacco smoke. He has quit using smokeless tobacco. He reports current alcohol use. He reports that he does not use drugs.    Family History:  History reviewed. No pertinent family history.    Medications:  Current Outpatient Medications   Medication Sig Dispense Refill    benztropine (COGENTIN) 0.5 MG tablet Take 1 tablet by mouth 2 times daily      escitalopram (LEXAPRO) 20 MG tablet Take 1 tablet (20 mg) by mouth daily      gabapentin (NEURONTIN) 600 MG tablet TAKE 2 TABLETS BY MOUTH TWICE A DAY      perphenazine 8 MG tablet       triamcinolone (KENALOG) 0.1 % external ointment Apply topically 2 times daily 30 g 1     Allergies   Allergen Reactions    Penicillins Hives

## 2025-01-16 ENCOUNTER — OFFICE VISIT (OUTPATIENT)
Dept: FAMILY MEDICINE | Facility: CLINIC | Age: 50
End: 2025-01-16
Payer: MEDICARE

## 2025-01-16 VITALS
HEART RATE: 93 BPM | HEIGHT: 72 IN | SYSTOLIC BLOOD PRESSURE: 104 MMHG | DIASTOLIC BLOOD PRESSURE: 72 MMHG | WEIGHT: 167.6 LBS | OXYGEN SATURATION: 97 % | TEMPERATURE: 97.2 F | BODY MASS INDEX: 22.7 KG/M2

## 2025-01-16 DIAGNOSIS — H69.93 DYSFUNCTION OF BOTH EUSTACHIAN TUBES: Primary | ICD-10-CM

## 2025-01-16 ASSESSMENT — PAIN SCALES - GENERAL: PAINLEVEL_OUTOF10: NO PAIN (0)

## 2025-01-16 NOTE — PROGRESS NOTES
"  Assessment & Plan     Dysfunction of both eustachian tubes  Has sudafed and flonase at home, would restart those back up                Subjective   Moustapha is a 49 year old, presenting for the following health issues:  Ear Problem        1/16/2025    10:07 AM   Additional Questions   Roomed by Maggie     History of Present Illness       Reason for visit:  Ringing in ears. Hearing loss  Symptom onset:  3-7 days ago  Symptom intensity:  Moderate  Symptom progression:  Staying the same  Had these symptoms before:  No   He is taking medications regularly.     Shares that symptoms have slightly improved but would like to discuss about options for meds              Review of Systems  Constitutional, HEENT, cardiovascular, pulmonary, gi and gu systems are negative, except as otherwise noted.      Objective    /72 (BP Location: Left arm, Patient Position: Sitting, Cuff Size: Adult Regular)   Pulse 93   Temp 97.2  F (36.2  C) (Skin)   Ht 1.835 m (6' 0.24\")   Wt 76 kg (167 lb 9.6 oz)   SpO2 97%   BMI 22.58 kg/m    Body mass index is 22.58 kg/m .  Physical Exam   GENERAL: alert and no distress  EYES: Eyes grossly normal to inspection  HENT: normal cephalic/atraumatic, both ears: clear effusion, nose and mouth without ulcers or lesions, oropharynx clear, and oral mucous membranes moist  RESP: lungs clear to auscultation - no rales, rhonchi or wheezes  CV: regular rate and rhythm, normal S1 S2, no S3 or S4, no murmur, click or rub, no peripheral edema             Signed Electronically by: Lamin Soto PA-C    "

## 2025-03-04 NOTE — TELEPHONE ENCOUNTER
"  REFERRAL INFORMATION:  Referring By: Lamin Soto PA-C  Referring Clinic: [facility]   Reason for Visit/Diagnosis: DX H93.13 (ICD-10-CM) - Tinnitus of both ears  REF'D by Lamin Soto PA-C  SCHEDULED W/ pt  CSC verified  Records in Epic  \"Date per pt\"      FUTURE VISIT INFORMATION:  Appointment Date: 6/18/25  Appointment Time: 8:20 AM     NOTES STATUS DETAILS   OFFICE NOTE from referring provider [facility]   1/16/25: Lamin Soto PA-C       "

## 2025-05-05 DIAGNOSIS — Z79.899 HIGH RISK MEDICATION USE: Primary | ICD-10-CM

## 2025-05-15 ENCOUNTER — LAB (OUTPATIENT)
Dept: LAB | Facility: CLINIC | Age: 50
End: 2025-05-15
Payer: MEDICARE

## 2025-05-15 DIAGNOSIS — Z79.899 HIGH RISK MEDICATION USE: ICD-10-CM

## 2025-05-15 LAB
CHOLEST SERPL-MCNC: 228 MG/DL
EST. AVERAGE GLUCOSE BLD GHB EST-MCNC: 100 MG/DL
FASTING STATUS PATIENT QL REPORTED: YES
FASTING STATUS PATIENT QL REPORTED: YES
GLUCOSE SERPL-MCNC: 100 MG/DL (ref 70–99)
HBA1C MFR BLD: 5.1 % (ref 0–5.6)
HDLC SERPL-MCNC: 89 MG/DL
LDLC SERPL CALC-MCNC: 124 MG/DL
NONHDLC SERPL-MCNC: 139 MG/DL
PROLACTIN SERPL 3RD IS-MCNC: 7 NG/ML (ref 4–15)
TRIGL SERPL-MCNC: 77 MG/DL

## 2025-05-27 ENCOUNTER — MYC REFILL (OUTPATIENT)
Dept: FAMILY MEDICINE | Facility: CLINIC | Age: 50
End: 2025-05-27
Payer: MEDICARE

## 2025-05-27 DIAGNOSIS — L20.84 INTRINSIC ECZEMA: ICD-10-CM

## 2025-05-28 RX ORDER — TRIAMCINOLONE ACETONIDE 1 MG/G
OINTMENT TOPICAL 2 TIMES DAILY
Qty: 30 G | Refills: 1 | Status: SHIPPED | OUTPATIENT
Start: 2025-05-28

## 2025-06-10 DIAGNOSIS — Z01.10 ENCOUNTER FOR HEARING EXAMINATION: Primary | ICD-10-CM

## 2025-06-11 ENCOUNTER — PATIENT OUTREACH (OUTPATIENT)
Dept: CARE COORDINATION | Facility: CLINIC | Age: 50
End: 2025-06-11
Payer: MEDICARE

## 2025-06-15 ENCOUNTER — HEALTH MAINTENANCE LETTER (OUTPATIENT)
Age: 50
End: 2025-06-15

## 2025-06-18 ENCOUNTER — OFFICE VISIT (OUTPATIENT)
Dept: OTOLARYNGOLOGY | Facility: CLINIC | Age: 50
End: 2025-06-18
Payer: MEDICARE

## 2025-06-18 ENCOUNTER — OFFICE VISIT (OUTPATIENT)
Dept: AUDIOLOGY | Facility: CLINIC | Age: 50
End: 2025-06-18
Payer: MEDICARE

## 2025-06-18 ENCOUNTER — PRE VISIT (OUTPATIENT)
Dept: OTOLARYNGOLOGY | Facility: CLINIC | Age: 50
End: 2025-06-18

## 2025-06-18 VITALS
DIASTOLIC BLOOD PRESSURE: 98 MMHG | HEIGHT: 72 IN | BODY MASS INDEX: 24.24 KG/M2 | SYSTOLIC BLOOD PRESSURE: 154 MMHG | WEIGHT: 179 LBS | HEART RATE: 69 BPM | OXYGEN SATURATION: 97 % | TEMPERATURE: 97.7 F

## 2025-06-18 DIAGNOSIS — H90.6 MIXED HEARING LOSS, BILATERAL: Primary | ICD-10-CM

## 2025-06-18 DIAGNOSIS — H90.6 MIXED CONDUCTIVE AND SENSORINEURAL HEARING LOSS OF BOTH EARS: Primary | ICD-10-CM

## 2025-06-18 DIAGNOSIS — H93.13 TINNITUS OF BOTH EARS: ICD-10-CM

## 2025-06-18 DIAGNOSIS — Z01.10 ENCOUNTER FOR HEARING EXAMINATION: ICD-10-CM

## 2025-06-18 ASSESSMENT — PAIN SCALES - GENERAL: PAINLEVEL_OUTOF10: NO PAIN (0)

## 2025-06-18 NOTE — NURSING NOTE
Chief Complaint   Patient presents with    Consult     Bilateral tinnititus      Pulse 69, temperature 97.7  F (36.5  C), height 1.829 m (6'), weight 81.2 kg (179 lb), SpO2 97%.  Keshawn Woody LPN

## 2025-06-18 NOTE — PROGRESS NOTES
Otolaryngology Clinic  June 18, 2025    Chief Complaint:   Bilateral tinnitus       History of Present Illness:   Moustapha Schreiber is a 50 year old male who presents today for evaluation of bilateral tinnitus.  Patient states that symptoms have been present for many years.  Reports a constant bilateral tinnitus that is a bit louder in the left than right.  It does affect patient's day-to-day activity or ability to fall asleep.  Patient does not notice any significant hearing loss. Patient denies any otalgia, otorrhea, dizziness, facial numbness/weakness, history of frequent ear infections, or ear surgeries.  Patient did have significant congestion and ear fullness in January of this year that has resolved.  There was no change in tinnitus at this time.  Patient denies any ear fullness or pressure.  Denies family history of hearing loss.  Patient does have a history of a concussion when they were 19 but does not recall if tinnitus began at that time or not    Past Medical History:  Past Medical History:   Diagnosis Date    Schizophrenia (H) 01/01/2007    Tinnitus 02       Past Surgical History:  No past surgical history on file.    Medications:  Current Outpatient Medications   Medication Sig Dispense Refill    benztropine (COGENTIN) 0.5 MG tablet Take 1 tablet by mouth 2 times daily      escitalopram (LEXAPRO) 20 MG tablet Take 1 tablet (20 mg) by mouth daily      gabapentin (NEURONTIN) 600 MG tablet TAKE 2 TABLETS BY MOUTH TWICE A DAY      perphenazine 8 MG tablet       triamcinolone (KENALOG) 0.1 % external ointment Apply topically 2 times daily. 30 g 1       Allergies:  Allergies   Allergen Reactions    Penicillins Hives        Social History:  Social History     Tobacco Use    Smoking status: Every Day     Current packs/day: 0.50     Average packs/day: 0.5 packs/day for 13.0 years (6.5 ttl pk-yrs)     Types: Cigarettes     Start date: 7/1/2012     Last attempt to quit: 1/4/2011     Passive exposure: Current     Smokeless tobacco: Former    Tobacco comments:     quit on 1/4/2011, picked back up ~1.5 years later   Vaping Use    Vaping status: Never Used   Substance Use Topics    Alcohol use: Yes     Comment: 3 drinks weekly    Drug use: No       ROS: 10 point ROS neg other than the symptoms noted above in the HPI.    Physical Exam:    BP (!) 154/98   Pulse 69   Temp 97.7  F (36.5  C)   Ht 1.829 m (6')   Wt 81.2 kg (179 lb)   SpO2 97%   BMI 24.28 kg/m       Constitutional:  The patient was unaccompanied, well-groomed, and in no acute distress.     Skin: Normal:  warm and pink without rash    Neurologic: Alert and oriented x 3.  CN's III-XII within normal limits.  Voice normal.    Psychiatric: The patient's affect was calm, cooperative, and appropriate.     Communication:  Normal; communicates verbally, normal voice quality.    Respiratory: Breathing comfortably without stridor or exertion of accessory muscles.    Ears: Pinnae and tragus non-tender.        Otologic microscope exam:    Right ear was examined under the microscope. Ear canal is clean and dry. Blunted, thickened TM that is intact.     Left ear was also examined under the microscope. Ear canal is clean and dry. Deeply impacted cerumen in anterior canal against TM. This was carefully cleaned with suction. Blunted, posteriorly thickened TM. Normal appearing anterior TM with nicely aerated middle ear.       Audiogram: 6/18/2025 - data independently reviewed  Right ear: Moderate rising to normal sloping to severe mixed hearing loss  Left ear: Moderate rising to mild sloping to severe mixed hearing loss  WR right: 96% left: 92% at 80 dB  Tympanograms: type B right, type As left     Assessment and Plan:  1. Tinnitus of both ears  Patient with longstanding history of bilateral tinnitus.  Audiogram today does show bilateral mixed hearing loss.  Explained to patient that tinnitus is a brain generated noise but can be a response to hearing loss.  Suspect that patient's  hearing loss is likely contributing to tinnitus symptoms.     Explained to patient that there is no specific medication or procedure that can eliminate tinnitus, however, there are evidence-based management strategies that can be used to improve symptoms.  Discussed management strategies with patient including tinnitus masking and cognitive behavioral approaches.  Recommend that, if patient finds quiet environments most bothersome, patient use a noise machine and/or fan as the tinnitus after to distract the brain from wanting to make tinnitus noise.     Because patient's tinnitus is likely due to hearing loss, patient could consider a hearing aid trial to treat both hearing loss and tinnitus symptoms.  Hearing aids have tinnitus masking features that can also be helpful for management of tinnitus. Patient will think about a hearing aid trial at this time. Patient is medically cleared for hearing aids.    2. 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 Recommend CT temporal bone to further evaluate. Order placed. Patient will call to schedule.     Patient will proceed with CT temporal bone. Will contact patient with results and recommendations. Follow up in 1 year with audiogram.    Laney Fam DNP, APRN, CNP  Otolaryngology  Head & Neck Surgery  [phone]    30 minutes spent by me on the date of the encounter doing chart review, history and exam, documentation and further activities per the note

## 2025-06-18 NOTE — PROGRESS NOTES
AUDIOLOGY REPORT    SUMMARY: Audiology visit completed. See audiogram for results.      RECOMMENDATIONS: Follow-up with ENT.    Diana De La Paz, [facility]  Licensed Audiologist  [place] #[id number]

## 2025-06-18 NOTE — LETTER
6/18/2025       RE: Moustapha Schreiber  [address] [facility] [address]     Dear Colleague,    Thank you for referring your patient, Moustapha Schreiber, to the [facility] [place] [facility]. Please see a copy of my visit note below.      Otolaryngology Clinic  June 18, 2025    Chief Complaint:   Bilateral tinnitus       History of Present Illness:   Moustapha Schreiber is a 50 year old male who presents today for evaluation of bilateral tinnitus.  Patient states that symptoms have been present for many years.  Reports a constant bilateral tinnitus that is a bit louder in the left than right.  It does affect patient's day-to-day activity or ability to fall asleep.  Patient does not notice any significant hearing loss. Patient denies any otalgia, otorrhea, dizziness, facial numbness/weakness, history of frequent ear infections, or ear surgeries.  Patient did have significant congestion and ear fullness in January of this year that has resolved.  There was no change in tinnitus at this time.  Patient denies any ear fullness or pressure.  Denies family history of hearing loss.  Patient does have a history of a concussion when they were 19 but does not recall if tinnitus began at that time or not    Past Medical History:  Past Medical History:   Diagnosis Date     Schizophrenia (H) 01/01/2007     Tinnitus 02       Past Surgical History:  No past surgical history on file.    Medications:  Current Outpatient Medications   Medication Sig Dispense Refill     benztropine (COGENTIN) 0.5 MG tablet Take 1 tablet by mouth 2 times daily       escitalopram (LEXAPRO) 20 MG tablet Take 1 tablet (20 mg) by mouth daily       gabapentin (NEURONTIN) 600 MG tablet TAKE 2 TABLETS BY MOUTH TWICE A DAY       perphenazine 8 MG tablet        triamcinolone (KENALOG) 0.1 % external ointment Apply topically 2 times daily. 30 g 1       Allergies:  Allergies    Allergen Reactions     Penicillins Hives        Social History:  Social History     Tobacco Use     Smoking status: Every Day     Current packs/day: 0.50     Average packs/day: 0.5 packs/day for 13.0 years (6.5 ttl pk-yrs)     Types: Cigarettes     Start date: 7/1/2012     Last attempt to quit: 1/4/2011     Passive exposure: Current     Smokeless tobacco: Former     Tobacco comments:     quit on 1/4/2011, picked back up ~1.5 years later   Vaping Use     Vaping status: Never Used   Substance Use Topics     Alcohol use: Yes     Comment: 3 drinks weekly     Drug use: No       ROS: 10 point ROS neg other than the symptoms noted above in the HPI.    Physical Exam:    BP (!) 154/98   Pulse 69   Temp 97.7  F (36.5  C)   Ht 1.829 m (6')   Wt 81.2 kg (179 lb)   SpO2 97%   BMI 24.28 kg/m       Constitutional:  The patient was unaccompanied, well-groomed, and in no acute distress.     Skin: Normal:  warm and pink without rash    Neurologic: Alert and oriented x 3.  CN's III-XII within normal limits.  Voice normal.    Psychiatric: The patient's affect was calm, cooperative, and appropriate.     Communication:  Normal; communicates verbally, normal voice quality.    Respiratory: Breathing comfortably without stridor or exertion of accessory muscles.    Ears: Pinnae and tragus non-tender.        Otologic microscope exam:    Right ear was examined under the microscope. Ear canal is clean and dry. Blunted, thickened TM that is intact.     Left ear was also examined under the microscope. Ear canal is clean and dry. Deeply impacted cerumen in anterior canal against TM. This was carefully cleaned with suction. Blunted, posteriorly thickened TM. Normal appearing anterior TM with nicely aerated middle ear.       Audiogram: 6/18/2025 - data independently reviewed  Right ear: Moderate rising to normal sloping to severe mixed hearing loss  Left ear: Moderate rising to mild sloping to severe mixed hearing loss  WR right: 96% left:  92% at 80 dB  Tympanograms: type B right, type As left     Assessment and Plan:  1. Tinnitus of both ears  Patient with longstanding history of bilateral tinnitus.  Audiogram today does show bilateral mixed hearing loss.  Explained to patient that tinnitus is a brain generated noise but can be a response to hearing loss.  Suspect that patient's hearing loss is likely contributing to tinnitus symptoms.     Explained to patient that there is no specific medication or procedure that can eliminate tinnitus, however, there are evidence-based management strategies that can be used to improve symptoms.  Discussed management strategies with patient including tinnitus masking and cognitive behavioral approaches.  Recommend that, if patient finds quiet environments most bothersome, patient use a noise machine and/or fan as the tinnitus after to distract the brain from wanting to make tinnitus noise.     Because patient's tinnitus is likely due to hearing loss, patient could consider a hearing aid trial to treat both hearing loss and tinnitus symptoms.  Hearing aids have tinnitus masking features that can also be helpful for management of tinnitus. Patient will think about a hearing aid trial at this time. Patient is medically cleared for hearing aids.    2. 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 Recommend CT temporal bone to further evaluate. Order placed. Patient will call to schedule.     Patient will proceed with CT temporal bone. Will contact patient with results and recommendations. Follow up in 1 year with audiogram.    Laney Fam DNP, APRN, CNP  Otolaryngology  Head & Neck Surgery  [phone]    30 minutes spent by me on the date of the encounter doing chart review, history and exam, documentation and further activities per the note    Again, thank you for allowing me to participate in the care of your patient.       Sincerely,    Peyton Fam, NP

## 2025-06-18 NOTE — PATIENT INSTRUCTIONS
- You were seen in the ENT Clinic today by Laney Fam NP.   - Recommend CT temporal bone to evaluate mixed hearing loss. Will contact you with results and recommendations once available.  - Can consider a hearing aid trial to treat hearing loss and tinnitus.   - Please send a [facility] message or call the ENT clinic at [phone] with any questions or concerns.

## 2025-07-06 ENCOUNTER — HEALTH MAINTENANCE LETTER (OUTPATIENT)
Age: 50
End: 2025-07-06